# Patient Record
Sex: MALE | Race: WHITE | URBAN - METROPOLITAN AREA
[De-identification: names, ages, dates, MRNs, and addresses within clinical notes are randomized per-mention and may not be internally consistent; named-entity substitution may affect disease eponyms.]

---

## 2017-03-09 ENCOUNTER — HOSPITAL ENCOUNTER (OUTPATIENT)
Dept: MEDSURG UNIT | Facility: HOSPITAL | Age: 82
End: 2017-03-13
Attending: FAMILY MEDICINE | Admitting: FAMILY MEDICINE

## 2017-03-16 ENCOUNTER — HISTORICAL (OUTPATIENT)
Dept: RADIOLOGY | Facility: HOSPITAL | Age: 82
End: 2017-03-16

## 2017-06-30 ENCOUNTER — HISTORICAL (OUTPATIENT)
Dept: ADMINISTRATIVE | Facility: HOSPITAL | Age: 82
End: 2017-06-30

## 2017-07-01 LAB
ABS NEUT (OLG): 2.95
BASOPHILS # BLD AUTO: 0.02 X10(3)/MCL
BASOPHILS NFR BLD AUTO: 0.5 %
BUN SERPL-MCNC: 18 MG/DL (ref 7–18)
CALCIUM SERPL-MCNC: 8.5 MG/DL (ref 8.5–10.1)
CHLORIDE SERPL-SCNC: 100 MMOL/L (ref 98–107)
CO2 SERPL-SCNC: 37.8 MMOL/L (ref 21–32)
CREAT SERPL-MCNC: 0.73 MG/DL (ref 0.7–1.3)
EOSINOPHIL # BLD AUTO: 0.14 X10(3)/MCL
EOSINOPHIL NFR BLD AUTO: 3.4 %
ERYTHROCYTE [DISTWIDTH] IN BLOOD BY AUTOMATED COUNT: 14 %
GLUCOSE SERPL-MCNC: 102 MG/DL (ref 74–106)
HCT VFR BLD AUTO: 40.8 % (ref 39–49)
HGB BLD-MCNC: 12.4 GM/DL (ref 12.6–16.6)
IMM GRANULOCYTES # BLD AUTO: 0.01 10*3/UL (ref 0–0.1)
IMM GRANULOCYTES NFR BLD AUTO: 0.2 % (ref 0–1)
INR PPP: 2.99
LYMPHOCYTES # BLD AUTO: 0.49 X10(3)/MCL
LYMPHOCYTES NFR BLD AUTO: 12 %
MAGNESIUM SERPL-MCNC: 2.2 MG/DL (ref 1.8–2.4)
MCH RBC QN AUTO: 28.5 PG (ref 27–33)
MCHC RBC AUTO-ENTMCNC: 30.4 GM/DL (ref 32–35)
MCV RBC AUTO: 93.8 FL (ref 84–97)
MONOCYTES # BLD AUTO: 0.47 X10(3)/MCL
MONOCYTES NFR BLD AUTO: 11.5 %
NEUTROPHILS # BLD AUTO: 2.95 X10(3)/MCL
NEUTROPHILS NFR BLD AUTO: 72.4 %
PHOSPHATE SERPL-MCNC: 3.5 MG/DL (ref 2.5–4.9)
PLATELET # BLD AUTO: 460 X10(3)/MCL (ref 151–368)
PMV BLD AUTO: 10 FL
POTASSIUM SERPL-SCNC: 3.6 MMOL/L (ref 3.5–5.1)
PROTHROMBIN TIME: 30.5 SECOND(S) (ref 9–11.5)
RBC # BLD AUTO: 4.35 X10(6)/MCL (ref 4.3–5.6)
SODIUM SERPL-SCNC: 141 MMOL/L (ref 136–145)
WBC # SPEC AUTO: 4.08 X10(3)/MCL (ref 3.4–9.2)

## 2017-07-02 LAB
ABS NEUT (OLG): 3.67
BASOPHILS # BLD AUTO: 0.02 X10(3)/MCL
BASOPHILS NFR BLD AUTO: 0.4 %
BUN SERPL-MCNC: 13 MG/DL (ref 7–18)
CALCIUM SERPL-MCNC: 8.7 MG/DL (ref 8.5–10.1)
CHLORIDE SERPL-SCNC: 99 MMOL/L (ref 98–107)
CO2 SERPL-SCNC: 35.3 MMOL/L (ref 21–32)
CREAT SERPL-MCNC: 0.79 MG/DL (ref 0.7–1.3)
EOSINOPHIL # BLD AUTO: 0.2 X10(3)/MCL
EOSINOPHIL NFR BLD AUTO: 3.9 %
ERYTHROCYTE [DISTWIDTH] IN BLOOD BY AUTOMATED COUNT: 14 %
GLUCOSE SERPL-MCNC: 105 MG/DL (ref 74–106)
HCT VFR BLD AUTO: 42.1 % (ref 39–49)
HGB BLD-MCNC: 13 GM/DL (ref 12.6–16.6)
IMM GRANULOCYTES # BLD AUTO: 0.02 10*3/UL (ref 0–0.1)
IMM GRANULOCYTES NFR BLD AUTO: 0.4 % (ref 0–1)
INR PPP: 4.93
LYMPHOCYTES # BLD AUTO: 0.58 X10(3)/MCL
LYMPHOCYTES NFR BLD AUTO: 11.4 %
MAGNESIUM SERPL-MCNC: 2.1 MG/DL (ref 1.8–2.4)
MCH RBC QN AUTO: 28.6 PG (ref 27–33)
MCHC RBC AUTO-ENTMCNC: 30.9 GM/DL (ref 32–35)
MCV RBC AUTO: 92.5 FL (ref 84–97)
MONOCYTES # BLD AUTO: 0.6 X10(3)/MCL
MONOCYTES NFR BLD AUTO: 11.8 %
NEUTROPHILS # BLD AUTO: 3.67 X10(3)/MCL
NEUTROPHILS NFR BLD AUTO: 72.1 %
PLATELET # BLD AUTO: 556 X10(3)/MCL (ref 151–368)
PMV BLD AUTO: 10 FL
POTASSIUM SERPL-SCNC: 4.2 MMOL/L (ref 3.5–5.1)
PROTHROMBIN TIME: 50.6 SECOND(S) (ref 9–11.5)
RBC # BLD AUTO: 4.55 X10(6)/MCL (ref 4.3–5.6)
SODIUM SERPL-SCNC: 139 MMOL/L (ref 136–145)
WBC # SPEC AUTO: 5.09 X10(3)/MCL (ref 3.4–9.2)

## 2017-07-03 LAB
INR PPP: 4.46
PROTHROMBIN TIME: 45.8 SECOND(S) (ref 9–11.5)

## 2017-07-04 LAB
INR PPP: 3.42
PROTHROMBIN TIME: 34.9 SECOND(S) (ref 9–11.5)

## 2017-07-05 LAB
INR PPP: 2.4
PROTHROMBIN TIME: 24.4 SECOND(S) (ref 9–11.5)

## 2017-07-06 LAB
INR PPP: 1.68
PROTHROMBIN TIME: 17.1 SECOND(S) (ref 9–11.5)

## 2017-07-07 LAB
FINAL CULTURE: NORMAL
FINAL CULTURE: NORMAL
INR PPP: 2.24
PROTHROMBIN TIME: 22.8 SECOND(S) (ref 9–11.5)

## 2017-07-08 LAB
INR PPP: 2.38
PROTHROMBIN TIME: 24.2 SECOND(S) (ref 9–11.5)

## 2017-07-09 LAB
INR PPP: 2.93
PROTHROMBIN TIME: 29.9 SECOND(S) (ref 9–11.5)

## 2017-07-10 LAB
INR PPP: 3.09
PROTHROMBIN TIME: 31.6 SECOND(S) (ref 9–11.5)

## 2017-07-11 LAB
APPEARANCE, UA: ABNORMAL
BACTERIA SPEC CULT: ABNORMAL
BILIRUB UR QL STRIP: NEGATIVE
COLOR UR: YELLOW
GLUCOSE (UA): NEGATIVE
HGB UR QL STRIP: ABNORMAL
INR PPP: 2.79
KETONES UR QL STRIP: NEGATIVE
LEUKOCYTE ESTERASE UR QL STRIP: ABNORMAL
NITRITE UR QL STRIP: NEGATIVE
PH UR STRIP: 8.5 [PH] (ref 4.6–8)
PROT UR QL STRIP: ABNORMAL
PROTHROMBIN TIME: 28.5 SECOND(S) (ref 9–11.5)
RBC #/AREA URNS HPF: ABNORMAL /[HPF]
SP GR UR STRIP: 1.02 (ref 1–1.03)
SQUAMOUS EPITHELIAL, UA: ABNORMAL
UROBILINOGEN UR STRIP-ACNC: 0.2
WBC #/AREA URNS HPF: ABNORMAL /HPF

## 2017-07-12 ENCOUNTER — HISTORICAL (OUTPATIENT)
Dept: ADMINISTRATIVE | Facility: HOSPITAL | Age: 82
End: 2017-07-12

## 2017-07-12 LAB
ABS NEUT (OLG): 3.74
ALBUMIN SERPL-MCNC: 2.6 GM/DL (ref 3.4–5)
ALBUMIN/GLOB SERPL: 0.7 RATIO (ref 1.1–2)
ALP SERPL-CCNC: 59 UNIT/L (ref 50–136)
ALT SERPL-CCNC: 25 UNIT/L (ref 12–78)
AST SERPL-CCNC: 22 UNIT/L (ref 10–37)
BASOPHILS # BLD AUTO: 0.03 X10(3)/MCL
BASOPHILS NFR BLD AUTO: 0.5 %
BILIRUB SERPL-MCNC: 0.4 MG/DL (ref 0.2–1)
BILIRUBIN DIRECT+TOT PNL SERPL-MCNC: 0.17 MG/DL (ref 0.05–0.2)
BILIRUBIN DIRECT+TOT PNL SERPL-MCNC: 0.23 MG/DL
BUN SERPL-MCNC: 28 MG/DL (ref 7–18)
CALCIUM SERPL-MCNC: 8.8 MG/DL (ref 8.5–10.1)
CHLORIDE SERPL-SCNC: 103 MMOL/L (ref 98–107)
CHOLEST SERPL-MCNC: 147 MG/DL (ref 50–200)
CHOLEST/HDLC SERPL: 3 {RATIO} (ref 0–5)
CO2 SERPL-SCNC: 29.4 MMOL/L (ref 21–32)
CREAT SERPL-MCNC: 0.98 MG/DL (ref 0.7–1.3)
EOSINOPHIL # BLD AUTO: 0.26 X10(3)/MCL
EOSINOPHIL NFR BLD AUTO: 4.7 %
ERYTHROCYTE [DISTWIDTH] IN BLOOD BY AUTOMATED COUNT: 14 %
GLOBULIN SER-MCNC: 3.7 GM/DL (ref 2.4–3.5)
GLUCOSE SERPL-MCNC: 88 MG/DL (ref 74–106)
HCT VFR BLD AUTO: 38.5 % (ref 39–49)
HDLC SERPL-MCNC: 48 MG/DL (ref 35–60)
HGB BLD-MCNC: 12.4 GM/DL (ref 12.6–16.6)
IMM GRANULOCYTES # BLD AUTO: 0.01 10*3/UL (ref 0–0.1)
IMM GRANULOCYTES NFR BLD AUTO: 0.2 % (ref 0–1)
INR PPP: 1.93
LDLC SERPL CALC-MCNC: 86 MG/DL (ref 50–140)
LYMPHOCYTES # BLD AUTO: 0.77 X10(3)/MCL
LYMPHOCYTES NFR BLD AUTO: 13.9 %
MCH RBC QN AUTO: 29.2 PG (ref 27–33)
MCHC RBC AUTO-ENTMCNC: 32.2 GM/DL (ref 32–35)
MCV RBC AUTO: 90.6 FL (ref 84–97)
MONOCYTES # BLD AUTO: 0.71 X10(3)/MCL
MONOCYTES NFR BLD AUTO: 12.9 %
NEUTROPHILS # BLD AUTO: 3.74 X10(3)/MCL
NEUTROPHILS NFR BLD AUTO: 67.8 %
PLATELET # BLD AUTO: 344 X10(3)/MCL (ref 151–368)
PMV BLD AUTO: 11 FL
POTASSIUM SERPL-SCNC: 4.1 MMOL/L (ref 3.5–5.1)
PROT SERPL-MCNC: 6.3 GM/DL (ref 6.4–8.2)
PROTHROMBIN TIME: 19.6 SECOND(S) (ref 9–11.5)
RBC # BLD AUTO: 4.25 X10(6)/MCL (ref 4.3–5.6)
SODIUM SERPL-SCNC: 139 MMOL/L (ref 136–145)
TRIGL SERPL-MCNC: 64 MG/DL (ref 30–150)
TSH SERPL-ACNC: 1.82 MIU/ML (ref 0.35–3.75)
VLDLC SERPL CALC-MCNC: 13 MG/DL
WBC # SPEC AUTO: 5.52 X10(3)/MCL (ref 3.4–9.2)

## 2017-07-18 ENCOUNTER — HISTORICAL (OUTPATIENT)
Dept: ADMINISTRATIVE | Facility: HOSPITAL | Age: 82
End: 2017-07-18

## 2017-07-19 ENCOUNTER — HISTORICAL (OUTPATIENT)
Dept: ADMINISTRATIVE | Facility: HOSPITAL | Age: 82
End: 2017-07-19

## 2017-07-20 ENCOUNTER — HISTORICAL (OUTPATIENT)
Dept: ADMINISTRATIVE | Facility: HOSPITAL | Age: 82
End: 2017-07-20

## 2017-07-20 LAB
ABS NEUT (OLG): 3.25
BASOPHILS # BLD AUTO: 0.03 X10(3)/MCL
BASOPHILS NFR BLD AUTO: 0.6 %
EOSINOPHIL # BLD AUTO: 0.51 X10(3)/MCL
EOSINOPHIL NFR BLD AUTO: 9.5 %
ERYTHROCYTE [DISTWIDTH] IN BLOOD BY AUTOMATED COUNT: 15 %
HCT VFR BLD AUTO: 37.6 % (ref 39–49)
HGB BLD-MCNC: 12 GM/DL (ref 12.6–16.6)
IMM GRANULOCYTES # BLD AUTO: 0.01 10*3/UL (ref 0–0.1)
IMM GRANULOCYTES NFR BLD AUTO: 0.2 % (ref 0–1)
INR PPP: 1.63
LYMPHOCYTES # BLD AUTO: 0.89 X10(3)/MCL
LYMPHOCYTES NFR BLD AUTO: 16.5 %
MCH RBC QN AUTO: 28.4 PG (ref 27–33)
MCHC RBC AUTO-ENTMCNC: 31.9 GM/DL (ref 32–35)
MCV RBC AUTO: 89.1 FL (ref 84–97)
MONOCYTES # BLD AUTO: 0.7 X10(3)/MCL
MONOCYTES NFR BLD AUTO: 13 %
NEUTROPHILS # BLD AUTO: 3.25 X10(3)/MCL
NEUTROPHILS NFR BLD AUTO: 60.2 %
PLATELET # BLD AUTO: 332 X10(3)/MCL (ref 151–368)
PMV BLD AUTO: 10 FL
PROTHROMBIN TIME: 16.6 SECOND(S) (ref 9–11.5)
PSA SERPL-MCNC: <0.13 NG/ML (ref 0–4)
RBC # BLD AUTO: 4.22 X10(6)/MCL (ref 4.3–5.6)
WBC # SPEC AUTO: 5.39 X10(3)/MCL (ref 3.4–9.2)

## 2017-07-27 ENCOUNTER — HISTORICAL (OUTPATIENT)
Dept: ADMINISTRATIVE | Facility: HOSPITAL | Age: 82
End: 2017-07-27

## 2017-07-27 LAB
EST. AVERAGE GLUCOSE BLD GHB EST-MCNC: 117 MG/DL
HBA1C MFR BLD: 5.7 % (ref 4.5–6.2)
INR PPP: 1.02
PROTHROMBIN TIME: 10.3 SECOND(S) (ref 9–11.5)

## 2017-08-02 ENCOUNTER — HISTORICAL (OUTPATIENT)
Dept: RADIOLOGY | Facility: HOSPITAL | Age: 82
End: 2017-08-02

## 2017-08-31 ENCOUNTER — HISTORICAL (OUTPATIENT)
Dept: ENDOSCOPY | Facility: HOSPITAL | Age: 82
End: 2017-08-31

## 2017-10-10 ENCOUNTER — HISTORICAL (OUTPATIENT)
Dept: FAMILY MEDICINE | Facility: CLINIC | Age: 82
End: 2017-10-10

## 2022-04-29 NOTE — H&P
Patient:   Cuauhtemoc Peters             MRN: [id number]            FIN: [id number]               Age:   85 years     Sex:  Male     :  1931   Associated Diagnoses:   CVA (cerebrovascular accident); Atrial fibrillation; COPD (chronic obstructive pulmonary disease); Urinary retention; Dysphagia   Author:   Barry Gordon MD      Basic Information   Present at bedside:  Family member, Medical personnel.    Referral source:  Daniela LLAMAS, Lloyd BECKETT    History limitation:  Clinical condition.       Chief Complaint   weakness      History of Present Illness   84yo male admitted today for PT/OT/ST and completion of his iV antibiotics.  He was originally sent to [facility] due to inability to urinate and lower abdominal pain.  Catheter placement was unable to be performed.  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  He was taken to surgery and a suprapubic catheter was placed.  He was also found to have an ileus in which an NG tube was placed.  At some time he developed AMS and a MRI was obtained that showed an acute nonhemorrhagic 7cm infarct in th eleft MCA region.  At that time he was found to be in A-fib with RVR.  He did not have previous h/o A-fib.  ST was consulted and he was able to pass a MBS and they recommended a pureed diet.  At this time he is confused but he is eating and has been working with PT.  He is being admitted here for rehab.      Review of Systems   Constitutional:  Weakness, No fever, No chills, No sweats, No fatigue.    Eye:  No double vision, No dry eyes, No photophobia.    Ear/Nose/Mouth/Throat:  No ear pain, No nasal congestion, No sore throat.    Respiratory:  No shortness of breath, No cough, No sputum production, No hemoptysis, No wheezing, No pleuritic pain.    Cardiovascular:  No chest pain, No palpitations, No peripheral edema, No syncope.    Gastrointestinal:  No nausea, No  vomiting, No diarrhea, No constipation, No heartburn, No abdominal pain.    Genitourinary:  No dysuria, No hematuria, No change in urine stream.    Hematology/Lymphatics:  No anemia, No bruising tendency, No bruise, No hemorrhage, No petechiae.    Endocrine:  No excessive thirst, No polyuria, No cold intolerance.    Immunologic:  No recurrent fevers, No recurrent infections.    Musculoskeletal:  No back pain, No joint pain, No muscle pain, No decreased range of motion.    Integumentary:  No rash, No pruritus, No petechiae, No skin lesion.    Neurologic:  Confusion, Not alert and oriented X4, No abnormal balance, No tingling.    Psychiatric:  No anxiety, No depression.       Health Status   Allergies:    Allergies (1) Active Reaction  Dilaudid hallucinations     Current medications:    No qualifying data available     Problem list:    Active Problems (5)  Acute disease or injury-related malnutrition   CA of prostate   COPD (chronic obstructive pulmonary disease)   Radiation   Urinary retention         Histories   Past Medical History:    Active  COPD (chronic obstructive pulmonary disease) ([id number]), A-fib, CVA, Prostate cancer   Family History:    History is unknown.   Procedure history:    Cystostomy Suprapubic (.) performed by Elida Montalvo MD on 6/27/2017 at 85 Years.  Comments:  6/27/2017 20:08 - Luciana Lopez RN  auto-populated from documented surgical case  Cystoscopy (.) performed by Herminio LLAMAS, Polo LORENZO on 6/18/2017 at 85 Years.  Comments:  6/18/2017 21:24 - Danielle Quan RN  auto-populated from documented surgical case  Appendectomy (SNOMED CT 682424691).  Cataract (SNOMED CT 254798636).   Social History        Social & Psychosocial Habits    Alcohol  11/28/2015  Use: Current    Type: Liquor    Frequency: 1-2 times per week    06/18/2017  Use: Current    Type: Liquor    Frequency: Daily    Comment: daughters states he is a daily drinker - 06/18/2017  22:53 - Janet Jaime RN    Substance Abuse  11/28/2015 Risk Assessment: Denies Substance Abuse    06/18/2017  Use: Never    Tobacco  11/28/2015  Use: Former smoker    06/18/2017  Use: Former smoker    Type: Cigarettes    Comment: used to smoke 1-2 pk/day. quit 40 yrs ago - 06/18/2017 22:54 - Janet Jaime RN  .        Physical Examination   General:  No acute distress, Not alert and oriented.         Appearance: Well nourished.         Behavior: Appropriate.         Skin: Normal for ethnicity.    Eye:  Pupils are equal, round and reactive to light, Extraocular movements are intact.    HENT:  Normocephalic, Normal hearing, Oral mucosa is moist, No pharyngeal erythema.    Neck:  Supple, Non-tender, No carotid bruit, No jugular venous distention, No lymphadenopathy, No thyromegaly.    Respiratory:  Breath sounds are equal, No chest wall tenderness, decreased BS but are clear.    Cardiovascular:  Normal rate, No gallop, Good pulses equal in all extremities, Normal peripheral perfusion, No edema, systolic murmur, irregular rhythm.    Gastrointestinal:  Soft, Non-tender, Non-distended, Normal bowel sounds, No organomegaly, suprapubic catheter.    Genitourinary:  No costovertebral angle tenderness, No urethral discharge.       Vital Signs (last 24 hrs)_____  Last Charted___________  Temp Oral     36.4 DegC  (JUN 30 15:00)  Heart Rate Peripheral   93 bpm  (JUN 30 15:00)  Resp Rate         16 br/min  (JUDD 30 15:00)  SBP      131 mmHg  (JUDD 30 15:00)  DBP      86 mmHg  (JUDD 30 15:00)  SpO2      L 32%  (JUN 30 15:10)  Weight      63.2 kg  (JUDD 30 15:07)  Height      180 cm  (JUN 30 15:07)  BMI      19.51  (JUDD 30 15:07)     Measurements from flowsheet : Measurements   6/30/2017 16:49 CDT      Weight Dosing             61.3 kg                             Weight Measured and Calculated in Lbs     135.14 lb                             Weighing Method           Bed                             Height/Length Dosing       167.64 cm    2017 15:07 CDT      Weight Measured           63.2 kg                             Height/Length Measured    180 cm                             Body Mass Index Measured  19.51 kg/m2                             Ideal Body Weight         78.02 kg                             Percent Ideal Weight      81 %    2017 15:02 CDT      Weight Dosing             Not Done: Done via form in ad hoc  (Not Done)                            Height/Length Dosing      Not Done: Done via form in ad hoc  (Not Done)    Lymphatics:  No lymphadenopathy neck, axilla, groin.    Musculoskeletal:  Normal range of motion, No tenderness, No swelling, Normal gait, strength to right is 4/5.    Integumentary:  Warm, Dry, Pink, Intact, No rash.    Neurologic:  Alert, Normal sensory, Normal motor function, No focal deficits, Cranial Nerves II-XII are grossly intact, Not oriented.    Psychiatric:  Cooperative, Normal judgment, Not appropriate mood & affect.       Review / Management   Results review:     No qualifying data available.    Radiology results   Rad Results (ST)          Impression and Plan   Diagnosis     CVA (cerebrovascular accident) (CTH19-NT I63.9).     Atrial fibrillation (OXJ31-HJ I48.91).     COPD (chronic obstructive pulmonary disease) (XQI10-JE J44.9).     Urinary retention (WAB47-WK R33.9).     Dysphagia (ZBV65-UT R13.10).     Course:  Improving.    Orders      (Selected)   Inpatient Orders  Ordered  INR - Protime: Routine collect, 17 5:00:00 CDT, Blood, q24hr, Lab Collect, 17 5:00:00 CDT  Incentive Spirometry Nursin17 17:57:00 CDT, Stop date 17 17:57:00 CDT, Please perform and teach patient to be independent., 17 17:57:00 CDT  OT Evaluation and Treatment: 17 16:40:00 CDT, ADL, Stop date 17 16:40:00 CDT, Wheelchair, Patient has IV, Patient on O2, Standard Precautions  PT Evaluation and Treatment: 17 16:39:00 CDT, Mobility, Patient has IV, Patient on O2,  Standard Precautions  Speech Language Pathology Eval and Treat: 17 9:00:00 CDT, Bedside Swallow Eval, Evaluation and Treatment, Wheelchair, Patient has IV, Patient on O2, Standard Precautions  Telemetry Monitorin17 16:00:00 CDT, q4hr, Wheelchair, Patient has IV, Patient on O2, Standard Precautions, CM Telemetry Monitoring  Ordered (Collected)  Blood Culture: 17 17:12:00 CDT, Now collect, Blood, Stop date 17 17:16:00 CDT  Blood Culture: 17 17:12:00 CDT, Now collect, Blood, Stop date 17 17:16:00 CDT.     will review transfer meds  follow labs especially PT/INR  PT/OT/ST consulted  DVT prophylaxis.     Course:  Improving.    Education and Follow-up:       Counseled: Patient, Family, Regarding diagnosis, Regarding treatment, Regarding medications.

## 2022-04-29 NOTE — DISCHARGE SUMMARY
DATE OF DISCHARGE:  07/11/2017    DISCHARGE DIAGNOSES:    1. Cerebrovascular accident.   2. New-onset atrial fibrillation.  3. Chronic obstructive pulmonary disease.   4. Status post hospitalization secondary to acute urinary retention requiring suprapubic catheter placement.   5. Dysphagia.  6. Coumadin toxicity.    BRIEF HISTORY AND PHYSICAL:  Patient is an 85-year-old white male who initially presented to emergency room on 06/18/2017, with some acute urinary retention.  He was sent to [facility] for further care.  Patient had failed attempt to insert a Cali catheter.  Patient did undergo suprapubic catheter placement.  Postoperatively, he has developed some AFib and a CVA.  This left him with some swallowing difficulties.  Did have to have an NG tube placed at that time to feed the patient.  He was admitted by the hospitalist to this facility on 06/30/2017.  He was noted to have elevated INR level.       Patient's stroke mostly affecting his cognition.  Did also affect his swallowing as he did develop the swallowing dysfunction after this.  He did have some weakness.  Last time he was transferred to this facility the NG tube was discontinued and he was taking in oral intake without any aspiration noted.       We did do physical therapy on the patient.  He did progress fairly well.  At times, therapy was limited as patient stated he could not go any more.  He refused any prolonged therapy.  His cognition at times was still a little off.  It looked like he would have some periods of expressive aphasia.       We did continue his warfarin therapy.  We did adjust doses as necessary.       On 07/11/2017, was felt patient could be discharged to a nursing home.  Did not feel that patient could return home at this point based on his current physical status.  Still having some periods of confusion.  Do not feel like he can live alone at this time.  Patient is being discharged to [facility].  I will continue to follow with the patient there.  We will resume all of his present discharge medications.  I will follow up with this patient in the nursing home.        ______________________________  MD GRADY Porras/UM  DD:  07/12/2017  Time:  02:43PM  DT:  07/12/2017  Time:  03:31PM  Job #:  [id number]